# Patient Record
Sex: FEMALE | Race: OTHER | Employment: FULL TIME | URBAN - METROPOLITAN AREA
[De-identification: names, ages, dates, MRNs, and addresses within clinical notes are randomized per-mention and may not be internally consistent; named-entity substitution may affect disease eponyms.]

---

## 2019-02-06 ENCOUNTER — HOSPITAL ENCOUNTER (EMERGENCY)
Age: 19
Discharge: HOME | End: 2019-02-06
Attending: EMERGENCY MEDICINE
Payer: COMMERCIAL

## 2019-02-06 VITALS
TEMPERATURE: 98.2 F | OXYGEN SATURATION: 100 % | SYSTOLIC BLOOD PRESSURE: 117 MMHG | RESPIRATION RATE: 16 BRPM | DIASTOLIC BLOOD PRESSURE: 75 MMHG | HEART RATE: 80 BPM

## 2019-02-06 DIAGNOSIS — R10.30 LOWER ABDOMINAL PAIN: Primary | ICD-10-CM

## 2019-02-06 LAB
A/G RATIO: 1.3 (ref 1.1–2.2)
ALBUMIN SERPL-MCNC: 4.4 G/DL (ref 3.4–5)
ALP BLD-CCNC: 71 U/L (ref 40–129)
ALT SERPL-CCNC: 18 U/L (ref 10–40)
AMYLASE: 61 U/L (ref 25–115)
ANION GAP SERPL CALCULATED.3IONS-SCNC: 12 MMOL/L (ref 3–16)
AST SERPL-CCNC: 17 U/L (ref 15–37)
BACTERIA WET PREP: NORMAL
BACTERIA: ABNORMAL /HPF
BASOPHILS ABSOLUTE: 0.1 K/UL (ref 0–0.2)
BASOPHILS RELATIVE PERCENT: 0.7 %
BILIRUB SERPL-MCNC: 0.3 MG/DL (ref 0–1)
BILIRUBIN URINE: NEGATIVE
BLOOD, URINE: ABNORMAL
BUN BLDV-MCNC: 14 MG/DL (ref 7–20)
CALCIUM SERPL-MCNC: 9.9 MG/DL (ref 8.3–10.6)
CHLORIDE BLD-SCNC: 104 MMOL/L (ref 99–110)
CLARITY: ABNORMAL
CLUE CELLS: NORMAL
CO2: 25 MMOL/L (ref 21–32)
COLOR: YELLOW
CREAT SERPL-MCNC: 0.6 MG/DL (ref 0.6–1.1)
EOSINOPHILS ABSOLUTE: 0.1 K/UL (ref 0–0.6)
EOSINOPHILS RELATIVE PERCENT: 0.6 %
EPITHELIAL CELLS WET PREP: NORMAL
EPITHELIAL CELLS, UA: 7 /HPF (ref 0–5)
GFR AFRICAN AMERICAN: >60
GFR NON-AFRICAN AMERICAN: >60
GLOBULIN: 3.4 G/DL
GLUCOSE BLD-MCNC: 96 MG/DL (ref 70–99)
GLUCOSE URINE: NEGATIVE MG/DL
HCG(URINE) PREGNANCY TEST: NEGATIVE
HCT VFR BLD CALC: 38.8 % (ref 36–48)
HEMOGLOBIN: 12.8 G/DL (ref 12–16)
HYALINE CASTS: 4 /LPF (ref 0–8)
KETONES, URINE: ABNORMAL MG/DL
LEUKOCYTE ESTERASE, URINE: NEGATIVE
LIPASE: 32 U/L (ref 13–60)
LYMPHOCYTES ABSOLUTE: 1.8 K/UL (ref 1–5.1)
LYMPHOCYTES RELATIVE PERCENT: 19.2 %
MCH RBC QN AUTO: 29.5 PG (ref 26–34)
MCHC RBC AUTO-ENTMCNC: 33 G/DL (ref 31–36)
MCV RBC AUTO: 89.2 FL (ref 80–100)
MICROSCOPIC EXAMINATION: YES
MONOCYTES ABSOLUTE: 0.7 K/UL (ref 0–1.3)
MONOCYTES RELATIVE PERCENT: 7.1 %
NEUTROPHILS ABSOLUTE: 6.7 K/UL (ref 1.7–7.7)
NEUTROPHILS RELATIVE PERCENT: 72.4 %
NITRITE, URINE: NEGATIVE
PDW BLD-RTO: 13.9 % (ref 12.4–15.4)
PH UA: 6.5
PLATELET # BLD: 244 K/UL (ref 135–450)
PMV BLD AUTO: 9.3 FL (ref 5–10.5)
POTASSIUM SERPL-SCNC: 4.4 MMOL/L (ref 3.5–5.1)
PROTEIN UA: NEGATIVE MG/DL
RBC # BLD: 4.35 M/UL (ref 4–5.2)
RBC UA: 2 /HPF (ref 0–4)
RBC WET PREP: NORMAL
SODIUM BLD-SCNC: 141 MMOL/L (ref 136–145)
SOURCE WET PREP: NORMAL
SPECIFIC GRAVITY UA: >=1.03
TOTAL PROTEIN: 7.8 G/DL (ref 6.4–8.2)
TRICHOMONAS PREP: NORMAL
URINE TYPE: ABNORMAL
UROBILINOGEN, URINE: 0.2 E.U./DL
WBC # BLD: 9.2 K/UL (ref 4–11)
WBC UA: 2 /HPF (ref 0–5)
WBC WET PREP: NORMAL
YEAST WET PREP: NORMAL

## 2019-02-06 PROCEDURE — 87210 SMEAR WET MOUNT SALINE/INK: CPT

## 2019-02-06 PROCEDURE — 87491 CHLMYD TRACH DNA AMP PROBE: CPT

## 2019-02-06 PROCEDURE — 99284 EMERGENCY DEPT VISIT MOD MDM: CPT

## 2019-02-06 PROCEDURE — 81001 URINALYSIS AUTO W/SCOPE: CPT

## 2019-02-06 PROCEDURE — 80053 COMPREHEN METABOLIC PANEL: CPT

## 2019-02-06 PROCEDURE — 83690 ASSAY OF LIPASE: CPT

## 2019-02-06 PROCEDURE — 87591 N.GONORRHOEAE DNA AMP PROB: CPT

## 2019-02-06 PROCEDURE — 36415 COLL VENOUS BLD VENIPUNCTURE: CPT

## 2019-02-06 PROCEDURE — 82150 ASSAY OF AMYLASE: CPT

## 2019-02-06 PROCEDURE — 84703 CHORIONIC GONADOTROPIN ASSAY: CPT

## 2019-02-06 PROCEDURE — 85025 COMPLETE CBC W/AUTO DIFF WBC: CPT

## 2019-02-06 ASSESSMENT — ENCOUNTER SYMPTOMS
COUGH: 0
COLOR CHANGE: 0
VOMITING: 0
NAUSEA: 0
CONSTIPATION: 0
DIARRHEA: 0
SHORTNESS OF BREATH: 0
WHEEZING: 0
BACK PAIN: 1
ABDOMINAL PAIN: 1

## 2019-02-06 ASSESSMENT — PAIN SCALES - GENERAL: PAINLEVEL_OUTOF10: 8

## 2019-02-07 LAB
C TRACH DNA GENITAL QL NAA+PROBE: NEGATIVE
N. GONORRHOEAE DNA: NEGATIVE

## 2020-05-26 ENCOUNTER — HOSPITAL ENCOUNTER (OUTPATIENT)
Dept: PHYSICAL THERAPY | Age: 20
Setting detail: THERAPIES SERIES
Discharge: HOME | End: 2020-05-26
Payer: COMMERCIAL

## 2020-05-26 PROCEDURE — 97161 PT EVAL LOW COMPLEX 20 MIN: CPT

## 2020-05-26 PROCEDURE — G0283 ELEC STIM OTHER THAN WOUND: HCPCS

## 2020-05-26 PROCEDURE — 97110 THERAPEUTIC EXERCISES: CPT

## 2020-05-26 NOTE — FLOWSHEET NOTE
expectations for rehab  -all patient questions were answered    HEP instruction:    HEP instruction: Written HEP instructions provided and reviewed   Access Code: [email or web address]   URL: [email or web address]   Date: 05/26/2020   Prepared by: Johanna Blackburn     Exercises   · Long Sitting Ankle Plantar Flexion with Resistance - 10 reps - 3 sets - 1x daily - 7x weekly   · Long Sitting Ankle Eversion with Resistance - 10 reps - 3 sets - 1x daily - 7x weekly   · Long Sitting Ankle Inversion with Resistance - 10 reps - 3 sets - 1x daily - 7x weekly   · Seated Toe Raise - 10 reps - 3 sets - 1x daily - 7x weekly          Therapeutic Exercise and NMR EXR  [x] (51502) Provided verbal/tactile cueing for activities related to strengthening, flexibility, endurance, ROM for improvements in  [] LE / Lumbar: LE, proximal hip, and core control with self care, mobility, lifting, ambulation. [] UE / Cervical: cervical, postural, scapular, scapulothoracic and UE control with self care, reaching, carrying, lifting, house/yardwork, driving, computer work.  [] (86971) Provided verbal/tactile cueing for activities related to improving balance, coordination, kinesthetic sense, posture, motor skill, proprioception to assist with   [] LE / lumbar: LE, proximal hip, and core control in self care, mobility, lifting, ambulation and eccentric single leg control. [] UE / cervical: cervical, scapular, scapulothoracic and UE control with self care, reaching, carrying, lifting, house/yardwork, driving, computer work.   [] (98786) Therapist is in constant attendance of 2 or more patients providing skilled therapy interventions, but not providing any significant amount of measurable one-on-one time to either patient, for improvements in  [] LE / lumbar: LE, proximal hip, and core control in self care, mobility, lifting, ambulation and eccentric single leg control.    [] UE / cervical: cervical, scapular, scapulothoracic and UE control with self care,

## 2020-05-28 ENCOUNTER — HOSPITAL ENCOUNTER (OUTPATIENT)
Dept: PHYSICAL THERAPY | Age: 20
Setting detail: THERAPIES SERIES
Discharge: HOME | End: 2020-05-28
Payer: COMMERCIAL

## 2020-05-28 PROCEDURE — 97110 THERAPEUTIC EXERCISES: CPT

## 2020-05-28 PROCEDURE — 97140 MANUAL THERAPY 1/> REGIONS: CPT

## 2020-05-28 PROCEDURE — G0283 ELEC STIM OTHER THAN WOUND: HCPCS

## 2020-05-28 NOTE — FLOWSHEET NOTE
Manual Intervention ([phone])  Start time: 300  End time: 308       L foot/ankle PROM, STM, gentle joint mobs                                              Pt. Education:  -patient educated on diagnosis, prognosis and expectations for rehab  -all patient questions were answered    HEP instruction:    HEP instruction: Written HEP instructions provided and reviewed   Access Code: [email or web address]   URL: [email or web address]   Date: 05/26/2020   Prepared by: Jaime Purdy     Exercises   · Long Sitting Ankle Plantar Flexion with Resistance - 10 reps - 3 sets - 1x daily - 7x weekly   · Long Sitting Ankle Eversion with Resistance - 10 reps - 3 sets - 1x daily - 7x weekly   · Long Sitting Ankle Inversion with Resistance - 10 reps - 3 sets - 1x daily - 7x weekly   · Seated Toe Raise - 10 reps - 3 sets - 1x daily - 7x weekly          Therapeutic Exercise and NMR EXR  [x] (86073) Provided verbal/tactile cueing for activities related to strengthening, flexibility, endurance, ROM for improvements in  [] LE / Lumbar: LE, proximal hip, and core control with self care, mobility, lifting, ambulation. [] UE / Cervical: cervical, postural, scapular, scapulothoracic and UE control with self care, reaching, carrying, lifting, house/yardwork, driving, computer work.  [] (87865) Provided verbal/tactile cueing for activities related to improving balance, coordination, kinesthetic sense, posture, motor skill, proprioception to assist with   [] LE / lumbar: LE, proximal hip, and core control in self care, mobility, lifting, ambulation and eccentric single leg control.    [] UE / cervical: cervical, scapular, scapulothoracic and UE control with self care, reaching, carrying, lifting, house/yardwork, driving, computer work.   [] (50897) Therapist is in constant attendance of 2 or more patients providing skilled therapy interventions, but not providing any significant amount of measurable one-on-one time to either patient, for improvements in  [] LE / lumbar: LE, proximal hip, and core control in self care, mobility, lifting, ambulation and eccentric single leg control. [] UE / cervical: cervical, scapular, scapulothoracic and UE control with self care, reaching, carrying, lifting, house/yardwork, driving, computer work. NMR and Therapeutic Activities:    [] (86961 or 33187) Provided verbal/tactile cueing for activities related to improving balance, coordination, kinesthetic sense, posture, motor skill, proprioception and motor activation to allow for proper function of   [] LE: / Lumbar core, proximal hip and LE with self care and ADLs  [] UE / Cervical: cervical, postural, scapular, scapulothoracic and UE control with self care, carrying, lifting, driving, computer work.   [] (46881) Gait Re-education- Provided training and instruction to the patient for proper LE, core and proximal hip recruitment and positioning and eccentric body weight control with ambulation re-education including up and down stairs     Home Management Training / Self Care:  [] (13526) Home Management Training / Self-care: ADLs and compensatory training, meal preparation, safety procedures and instruction in use of adaptive equipment, including bathing, grooming, dressing, personal hygiene, basic household cleaning and chores.      Home Exercise Program:    [x] (75673) Reviewed/Progressed HEP activities related to strengthening, flexibility, endurance, ROM of   [] LE / Lumbar: core, proximal hip and LE for functional self-care, mobility, lifting and ambulation/stair navigation   [] UE / Cervical: cervical, postural, scapular, scapulothoracic and UE control with self care, reaching, carrying, lifting, house/yardwork, driving, computer work  [] (32336)Reviewed/Progressed HEP activities related to improving balance, coordination, kinesthetic sense, posture, motor skill, proprioception of   [] LE: core, proximal hip and LE for self care, mobility, lifting, and ambulation/stair navigation    [] UE / Cervical: cervical, postural,  scapular, scapulothoracic and UE control with self care, reaching, carrying, lifting, house/yardwork, driving, computer work    Manual Treatments:  PROM / STM / Oscillations-Mobs:  G-I, II, III, IV (PA's, Inf., Post.)  [x] (98356) Provided manual therapy to mobilize LE, proximal hip and/or LS spine soft tissue/joints for the purpose of modulating pain, promoting relaxation,  increasing ROM, reducing/eliminating soft tissue swelling/inflammation/restriction, improving soft tissue extensibility and allowing for proper ROM for normal function with   [] LE / lumbar: self care, mobility, lifting and ambulation. [] UE / Cervical: self care, reaching, carrying, lifting, house/yardwork, driving, computer work. Modalities:  [x] (93307) Vasopneumatic compression: Utilized vasopneumatic compression to decrease edema / swelling for the purpose of improving mobility and quad tone / recruitment which will allow for increased overall function including but not limited to self-care, transfers, ambulation, and ascending / descending stairs. Modalities:  IFC to L ankle , 15 min with CP  Start time:  340  End time: 355    Charges:  Timed Code Treatment Minutes: 55   Total Treatment Minutes: 55      [] EVAL - LOW (03562)   [] EVAL - MOD (11431)  [] EVAL - HIGH (33803)  [] RE-EVAL (82634)  [x] GT(38083) x   2    [] Ionto  [] NMR (72180) x       [] Vaso  [x] Manual (74058) x 1     [] Ultrasound  [] TA x        [] Mech Traction (50112)  [] Aquatic Therapy x     [x] ES (un) (41275):   [] Home Management Training x  [] ES(attended) (41729)   [] Dry Needling 1-2 muscles (46249):  [] Dry Needling 3+ muscles (751150  [] Group:      [] Other:       GOALS:  Patient stated goal: to get back to work   [x]? Progressing: []? Met: []? Not Met: []? Adjusted     Therapist goals for Patient:   Short Term Goals: To be achieved in: 2 weeks  1. []Housework  []Driving [x]Job related tasks  []Sports/Recreation []Other:        ASSESSMENT:  Pt demonstrate improved ROM this date after manual tx. Pt continues to be limited by pain and faitgue of foot/ankle muscles during tx. Treatment/Activity Tolerance:  [x] Patient able to complete tx [] Patient limited by fatigue  [x] Patient limited by pain  [] Patient limited by other medical complications  [] Other:     Prognosis: [x] Good [] Fair  [] Poor    Patient Requires Follow-up: [x] Yes  [] No    Plan for next treatment session: progress ankle exercises, balance     PLAN: See eval. PT 2x / week for 6 weeks. [x] Continue per plan of care [] Alter current plan (see comments)  [] Plan of care initiated [] Hold pending MD visit [] Discharge    Electronically signed by: Kaz Calvillo PT, DPT    Note: If patient does not return for scheduled/ recommended follow up visits, this note will serve as a discharge from care along with most recent update on progress.

## 2020-06-02 ENCOUNTER — HOSPITAL ENCOUNTER (OUTPATIENT)
Dept: PHYSICAL THERAPY | Age: 20
Setting detail: THERAPIES SERIES
Discharge: HOME | End: 2020-06-02
Payer: COMMERCIAL

## 2020-06-02 PROCEDURE — 97140 MANUAL THERAPY 1/> REGIONS: CPT

## 2020-06-02 PROCEDURE — 97112 NEUROMUSCULAR REEDUCATION: CPT

## 2020-06-02 PROCEDURE — G0283 ELEC STIM OTHER THAN WOUND: HCPCS

## 2020-06-02 PROCEDURE — 97110 THERAPEUTIC EXERCISES: CPT

## 2020-06-02 NOTE — FLOWSHEET NOTE
Adjusted     Therapist goals for Patient:   Short Term Goals: To be achieved in: 2 weeks  1. Independent in HEP and progression per patient tolerance, in order to prevent re-injury. [x]? Progressing: []? Met: []? Not Met: []? Adjusted  2. Patient will have a decrease in pain to 0/10 to facilitate improvement in movement, function, and ADLs as indicated by Functional Deficits. [x]? Progressing: []? Met: []? Not Met: []? Adjusted     Long Term Goals: To be achieved in: 6 weeks  1. Disability index score of 90% or less for the LEFS to assist with reaching prior level of function. [x]? Progressing: []? Met: []? Not Met: []? Adjusted  2. Patient will demonstrate increased AROM to L foot to WNL to allow for proper joint functioning as indicated by patients Functional Deficits. [x]? Progressing: []? Met: []? Not Met: []? Adjusted  3. Patient will demonstrate an increase in Strength to at least 4+/5 to L foot as well as good proximal hip strength and control to allow for proper functional mobility as indicated by patients Functional Deficits. [x]? Progressing: []? Met: []? Not Met: []? Adjusted  4. Patient will return to functional activities including walking, standing for prolonged periods of time without increased symptoms or restriction. [x]? Progressing: []? Met: []? Not Met: []? Adjusted         Overall Progression Towards Functional goals/ Treatment Progress Update:  [] Patient is progressing as expected towards functional goals listed. [] Progression is slowed due to complexities/Impairments listed. [] Progression has been slowed due to co-morbidities.   [x] Plan just implemented, too soon to assess goals progression <30days   [] Goals require adjustment due to lack of progress  [] Patient is not progressing as expected and requires additional follow up with physician  [] Other    Persisting Functional Limitations/Impairments:  []Sleeping []Sitting               [x]Standing []Transfers        [x]Walking []Kneeling               [x]Stairs []Squatting / bending   [x]ADLs []Reaching  []Lifting  []Housework  []Driving [x]Job related tasks  []Sports/Recreation []Other:        ASSESSMENT:  Pt is progressing through exercises and treatment well with no increased pain and pt is demonstrating improved ROM and strength. PT Issued new lime theraband for HEP to keep progressing strength. Single leg balance was introduced this date with alittle discomfort with BUE. Treatment/Activity Tolerance:  [x] Patient able to complete tx [] Patient limited by fatigue  [x] Patient limited by pain  [] Patient limited by other medical complications  [] Other:     Prognosis: [x] Good [] Fair  [] Poor    Patient Requires Follow-up: [x] Yes  [] No    Plan for next treatment session: progress ankle exercises, balance     PLAN: See jayna. PT 2x / week for 6 weeks. [x] Continue per plan of care [] Alter current plan (see comments)  [] Plan of care initiated [] Hold pending MD visit [] Discharge    Electronically signed by: Ghulam Adair PT, DPT    Note: If patient does not return for scheduled/ recommended follow up visits, this note will serve as a discharge from care along with most recent update on progress.

## 2020-06-04 ENCOUNTER — HOSPITAL ENCOUNTER (OUTPATIENT)
Dept: PHYSICAL THERAPY | Age: 20
Setting detail: THERAPIES SERIES
Discharge: HOME | End: 2020-06-04
Payer: COMMERCIAL

## 2020-06-04 PROCEDURE — 97140 MANUAL THERAPY 1/> REGIONS: CPT

## 2020-06-04 PROCEDURE — 97110 THERAPEUTIC EXERCISES: CPT

## 2020-06-04 PROCEDURE — G0283 ELEC STIM OTHER THAN WOUND: HCPCS

## 2020-06-04 PROCEDURE — 97112 NEUROMUSCULAR REEDUCATION: CPT

## 2020-06-04 NOTE — FLOWSHEET NOTE
weight control with ambulation re-education including up and down stairs     Home Management Training / Self Care:  [] (44933) Home Management Training / Self-care: ADLs and compensatory training, meal preparation, safety procedures and instruction in use of adaptive equipment, including bathing, grooming, dressing, personal hygiene, basic household cleaning and chores. Home Exercise Program:    [x] (17300) Reviewed/Progressed HEP activities related to strengthening, flexibility, endurance, ROM of   [] LE / Lumbar: core, proximal hip and LE for functional self-care, mobility, lifting and ambulation/stair navigation   [] UE / Cervical: cervical, postural, scapular, scapulothoracic and UE control with self care, reaching, carrying, lifting, house/yardwork, driving, computer work  [] (35510)Reviewed/Progressed HEP activities related to improving balance, coordination, kinesthetic sense, posture, motor skill, proprioception of   [] LE: core, proximal hip and LE for self care, mobility, lifting, and ambulation/stair navigation    [] UE / Cervical: cervical, postural,  scapular, scapulothoracic and UE control with self care, reaching, carrying, lifting, house/yardwork, driving, computer work    Manual Treatments:  PROM / STM / Oscillations-Mobs:  G-I, II, III, IV (PA's, Inf., Post.)  [x] (56491) Provided manual therapy to mobilize LE, proximal hip and/or LS spine soft tissue/joints for the purpose of modulating pain, promoting relaxation,  increasing ROM, reducing/eliminating soft tissue swelling/inflammation/restriction, improving soft tissue extensibility and allowing for proper ROM for normal function with   [] LE / lumbar: self care, mobility, lifting and ambulation. [] UE / Cervical: self care, reaching, carrying, lifting, house/yardwork, driving, computer work.      Modalities:  [x] (66244) Vasopneumatic compression: Utilized vasopneumatic compression to decrease edema / swelling for the purpose of improving mobility and quad tone / recruitment which will allow for increased overall function including but not limited to self-care, transfers, ambulation, and ascending / descending stairs. Modalities:  IFC to L ankle , 15 min with CP  Start time:  343  End time: 358    Charges:  Timed Code Treatment Minutes: 43   Total Treatment Minutes: 58      [] EVAL - LOW (39576)   [] EVAL - MOD (96613)  [] EVAL - HIGH (26082)  [] RE-EVAL (01504)  [x] ZA(13353) x   1    [] Ionto  [x] NMR (93268) x  1     [] Vaso  [x] Manual (61339) x 1     [] Ultrasound  [] TA x        [] Mech Traction (46556)  [] Aquatic Therapy x     [x] ES (un) (18782):   [] Home Management Training x  [] ES(attended) (81016)   [] Dry Needling 1-2 muscles (00289):  [] Dry Needling 3+ muscles (802784  [] Group:      [] Other:       GOALS:  Patient stated goal: to get back to work   [x]? Progressing: []? Met: []? Not Met: []? Adjusted     Therapist goals for Patient:   Short Term Goals: To be achieved in: 2 weeks  1. Independent in HEP and progression per patient tolerance, in order to prevent re-injury. [x]? Progressing: []? Met: []? Not Met: []? Adjusted  2. Patient will have a decrease in pain to 0/10 to facilitate improvement in movement, function, and ADLs as indicated by Functional Deficits. [x]? Progressing: []? Met: []? Not Met: []? Adjusted     Long Term Goals: To be achieved in: 6 weeks  1. Disability index score of 90% or less for the LEFS to assist with reaching prior level of function. [x]? Progressing: []? Met: []? Not Met: []? Adjusted  2. Patient will demonstrate increased AROM to L foot to WNL to allow for proper joint functioning as indicated by patients Functional Deficits. [x]? Progressing: []? Met: []? Not Met: []? Adjusted  3. Patient will demonstrate an increase in Strength to at least 4+/5 to L foot as well as good proximal hip strength and control to allow for proper functional mobility as indicated by patients Functional Deficits. pending MD visit [] Discharge    Electronically signed by: Negar Pacheco PT, DPT    Note: If patient does not return for scheduled/ recommended follow up visits, this note will serve as a discharge from care along with most recent update on progress.

## 2020-06-09 ENCOUNTER — HOSPITAL ENCOUNTER (OUTPATIENT)
Dept: PHYSICAL THERAPY | Age: 20
Setting detail: THERAPIES SERIES
Discharge: HOME | End: 2020-06-09
Payer: COMMERCIAL

## 2020-06-09 PROCEDURE — G0283 ELEC STIM OTHER THAN WOUND: HCPCS

## 2020-06-09 PROCEDURE — 97112 NEUROMUSCULAR REEDUCATION: CPT

## 2020-06-09 PROCEDURE — 97110 THERAPEUTIC EXERCISES: CPT

## 2020-06-09 NOTE — FLOWSHEET NOTE
[facility]  Phone: [phone]   Fax: [phone]    Physical Therapy Daily Treatment Note  Date:  2020    Patient Name:  Alex Pulliam    :  2000  MRN: [id number]  Medical/Treatment Diagnosis Information:    Diagnosis: Q44.230C (ICD-10-CM) - Unspecified sprain of left foot, initial encounter , S90.32XA (ICD-10-CM) - Contusion of left foot, initial encounter      Treatment Diagnosis: L foot pain, weakness, decreased ROM        Insurance/Certification information:   [facility]  Physician Information:    Referring Practitioner: LUZ MARIA Trejo      Plan of care signed (Y/N): []  Yes [x]  No     Date of Patient follow up with Physician:      Progress Report: []  Yes  [x]  No     Date Range for reporting period:  Beginning 20  Ending    Progress report due (10 Rx/or 30 days whichever is less): weekly     Recertification due (POC duration/ or 90 days whichever is less): 20    Visit # Insurance Allowable Auth required? Date Range    12 [x]  Yes  []  No n/a     Latex Allergy:  [x]NO      []YES  Preferred Language for Healthcare:   [x]English       []other:    Functional Scale:       Date assessed:  LEFS: raw score = 48; dysfunction = 40-59%   20     Pain level:  3/10 only when walking      SUBJECTIVE:  Pt reports she is able to do a little more now. Ankle is slowly getting better. Pain is decreasing.      OBJECTIVE:       PROM AROM AROM PROM      L R L R L /4 L 6/4    Hip Flexion             Hip Abduction             Hip ER             Hip IR             Knee Flexion             Knee Extension             Dorsiflexion  10 20 5 20 7 15    Plantarflexion  50 50 45 50 50 50    Inversion  25 30 20 30 20 25    Eversion  15 25 10 25 15 20          Strength (0-5) / Myotomes Left Right L  6/4     Hip Flexion - supine         Hip Flexion - seated (L1-2)         Hip Abduction         Hip Adduction         Hip ER         Hip IR

## 2020-06-11 ENCOUNTER — HOSPITAL ENCOUNTER (OUTPATIENT)
Dept: PHYSICAL THERAPY | Age: 20
Setting detail: THERAPIES SERIES
Discharge: HOME | End: 2020-06-11
Payer: COMMERCIAL

## 2020-06-11 PROCEDURE — G0283 ELEC STIM OTHER THAN WOUND: HCPCS

## 2020-06-11 PROCEDURE — 97140 MANUAL THERAPY 1/> REGIONS: CPT

## 2020-06-11 PROCEDURE — 97110 THERAPEUTIC EXERCISES: CPT

## 2020-06-11 PROCEDURE — 97112 NEUROMUSCULAR REEDUCATION: CPT

## 2020-06-11 NOTE — FLOWSHEET NOTE
Resistance - 10 reps - 3 sets - 1x daily - 7x weekly   · Long Sitting Ankle Eversion with Resistance - 10 reps - 3 sets - 1x daily - 7x weekly   · Long Sitting Ankle Inversion with Resistance - 10 reps - 3 sets - 1x daily - 7x weekly   · Seated Toe Raise - 10 reps - 3 sets - 1x daily - 7x weekly          Therapeutic Exercise and NMR EXR  [x] (69004) Provided verbal/tactile cueing for activities related to strengthening, flexibility, endurance, ROM for improvements in  [] LE / Lumbar: LE, proximal hip, and core control with self care, mobility, lifting, ambulation. [] UE / Cervical: cervical, postural, scapular, scapulothoracic and UE control with self care, reaching, carrying, lifting, house/yardwork, driving, computer work.  [] (80703) Provided verbal/tactile cueing for activities related to improving balance, coordination, kinesthetic sense, posture, motor skill, proprioception to assist with   [] LE / lumbar: LE, proximal hip, and core control in self care, mobility, lifting, ambulation and eccentric single leg control. [] UE / cervical: cervical, scapular, scapulothoracic and UE control with self care, reaching, carrying, lifting, house/yardwork, driving, computer work.   [] (68225) Therapist is in constant attendance of 2 or more patients providing skilled therapy interventions, but not providing any significant amount of measurable one-on-one time to either patient, for improvements in  [] LE / lumbar: LE, proximal hip, and core control in self care, mobility, lifting, ambulation and eccentric single leg control. [] UE / cervical: cervical, scapular, scapulothoracic and UE control with self care, reaching, carrying, lifting, house/yardwork, driving, computer work.      NMR and Therapeutic Activities:    [x] (15819 or 06667) Provided verbal/tactile cueing for activities related to improving balance, coordination, kinesthetic sense, posture, motor skill, proprioception and motor activation to allow for Treatment/Activity Tolerance:   [x] Patient able to complete tx [] Patient limited by fatigue  [x] Patient limited by pain  [] Patient limited by other medical complications  [] Other:     Prognosis: [x] Good [] Fair  [] Poor    Patient Requires Follow-up: [x] Yes  [] No    Plan for next treatment session: progress ankle exercises, balance     PLAN: See jayna. PT 2x / week for 6 weeks. [x] Continue per plan of care [] Alter current plan (see comments)  [] Plan of care initiated [] Hold pending MD visit [] Discharge    Electronically signed by: Richelle Kimble PT, DPT    Note: If patient does not return for scheduled/ recommended follow up visits, this note will serve as a discharge from care along with most recent update on progress.

## 2020-06-16 ENCOUNTER — HOSPITAL ENCOUNTER (OUTPATIENT)
Dept: PHYSICAL THERAPY | Age: 20
Setting detail: THERAPIES SERIES
Discharge: HOME | End: 2020-06-16
Payer: COMMERCIAL

## 2020-06-16 PROCEDURE — G0283 ELEC STIM OTHER THAN WOUND: HCPCS

## 2020-06-16 PROCEDURE — 97112 NEUROMUSCULAR REEDUCATION: CPT

## 2020-06-16 PROCEDURE — 97140 MANUAL THERAPY 1/> REGIONS: CPT

## 2020-06-16 PROCEDURE — 97110 THERAPEUTIC EXERCISES: CPT

## 2020-06-16 NOTE — FLOWSHEET NOTE
[facility] Physical Therapy  Phone: [phone]   Fax: [phone]    Physical Therapy Daily Treatment Note  Date:  2020    Patient Name:  Solo Pulliam    :  2000  MRN: [id number]  Medical/Treatment Diagnosis Information:    Diagnosis: X33.451K (ICD-10-CM) - Unspecified sprain of left foot, initial encounter , S90.32XA (ICD-10-CM) - Contusion of left foot, initial encounter      Treatment Diagnosis: L foot pain, weakness, decreased ROM        Insurance/Certification information:   [facility]  Physician Information:    Referring Practitioner: LUZ MARIA Pham      Plan of care signed (Y/N): []  Yes [x]  No     Date of Patient follow up with Physician:      Progress Report: [x]  Yes  []  No     Date Range for reporting period:  Beginning 20  Endin20    Progress report due (10 Rx/or 30 days whichever is less): weekly     Recertification due (POC duration/ or 90 days whichever is less): 20    Visit # Insurance Allowable Auth required? Date Range    12 [x]  Yes  []  No n/a     Latex Allergy:  [x]NO      []YES  Preferred Language for Healthcare:   [x]English       []other:    Functional Scale:       Date assessed:  LEFS: raw score = 48; dysfunction = 40-59%   20     Pain level:  5-6/10 only when walking      SUBJECTIVE:  Pt reports they got a new walking boot that she is  able to wear at work which is helping her a lot.      OBJECTIVE:       PROM AROM AROM PROM AROM PROM     L R L R L / L 6/ L / L 6/11   Hip Flexion               Hip Abduction               Hip ER               Hip IR               Knee Flexion               Knee Extension               Dorsiflexion  10 20 5 20 7 15 5 15   Plantarflexion  50 50 45 50 50 50 50 60   Inversion  25 30 20 30 20 25 35 40   Eversion  15 25 10 25 15 20 15 25         Strength (0-5) / Myotomes Left Right L  6/4 L 6   Hip Flexion - supine         Hip Flexion - seated (L1-2)         Hip allowing for proper ROM for normal function with   [] LE / lumbar: self care, mobility, lifting and ambulation. [] UE / Cervical: self care, reaching, carrying, lifting, house/yardwork, driving, computer work. Modalities:  [x] (54092) Vasopneumatic compression: Utilized vasopneumatic compression to decrease edema / swelling for the purpose of improving mobility and quad tone / recruitment which will allow for increased overall function including but not limited to self-care, transfers, ambulation, and ascending / descending stairs. Modalities:  IFC to L ankle , 15 min with CP  Start time:  341  End time: 356    Charges:  Timed Code Treatment Minutes: 41   Total Treatment Minutes: 56      [] EVAL - LOW (91669)   [] EVAL - MOD (72407)  [] EVAL - HIGH (22740)  [] RE-EVAL (15305)  [x] JY(12428) x   1    [] Ionto  [x] NMR (06889) x  1     [] Vaso  [x] Manual (75238) x 1     [] Ultrasound  [] TA x        [] Mech Traction (59147)  [] Aquatic Therapy x     [x] ES (un) (30604):   [] Home Management Training x  [] ES(attended) (38859)   [] Dry Needling 1-2 muscles (70302):  [] Dry Needling 3+ muscles (144383  [] Group:      [] Other:       GOALS:  Patient stated goal: to get back to work   [x]? Progressing: []? Met: []? Not Met: []? Adjusted     Therapist goals for Patient:   Short Term Goals: To be achieved in: 2 weeks  1. Independent in HEP and progression per patient tolerance, in order to prevent re-injury. [x]? Progressing: []? Met: []? Not Met: []? Adjusted  2. Patient will have a decrease in pain to 0/10 to facilitate improvement in movement, function, and ADLs as indicated by Functional Deficits. [x]? Progressing: []? Met: []? Not Met: []? Adjusted     Long Term Goals: To be achieved in: 6 weeks  1. Disability index score of 90% or less for the LEFS to assist with reaching prior level of function. [x]? Progressing: []? Met: []? Not Met: []? Adjusted  2.  Patient will demonstrate increased AROM to L foot

## 2020-06-18 ENCOUNTER — HOSPITAL ENCOUNTER (OUTPATIENT)
Dept: PHYSICAL THERAPY | Age: 20
Setting detail: THERAPIES SERIES
Discharge: HOME | End: 2020-06-18
Payer: COMMERCIAL

## 2020-06-18 PROCEDURE — G0283 ELEC STIM OTHER THAN WOUND: HCPCS

## 2020-06-18 PROCEDURE — 97110 THERAPEUTIC EXERCISES: CPT

## 2020-06-18 PROCEDURE — 97112 NEUROMUSCULAR REEDUCATION: CPT

## 2020-06-18 PROCEDURE — 97140 MANUAL THERAPY 1/> REGIONS: CPT

## 2020-06-18 NOTE — FLOWSHEET NOTE
Flexion - seated (L1-2)           Hip Abduction           Hip Adduction           Hip ER           Hip IR           Quads (L2-4)           Hamstrings           Ankle Dorsiflexion (L4-5) 3+ 5 4 4 4+    Ankle Plantarflexion (S1-2) 3+ 5 4 4+ 4+    Ankle Inversion 3+ 5 4- 4- 4     Ankle Eversion (S1-2) 3+ 5 4- 4- 4    Great Toe Extension (L5)                                                                                                                                                                     RESTRICTIONS/PRECAUTIONS: n/a    Exercises/Interventions:     Therapeutic Exercises (74457)  Start time: 312  End time: 335 Resistance / level Sets/sec Reps Notes   4 way ankle  ^to blue   nustep   5'      AROM L ankle ABCs with #1  2  ^ 1lb ankle weight around foot   INV isometrics with ball  2 10 3 sec holds   Towel cruntches     Towel Talking Layers wipers      IB/ single HR  2 30'' 10 single HR each set, with UE support     Foot marble         Toe walk  1lap      Heel walk  1 lap                   Therapeutic Activities (63299)  Start time:  End time:                                   Neuromuscular Re-ed (20174)  Start time: 335  End time: 345       airex single leg stance , semi tandem  LLE with UE PRN     BAPS board taps  2 10 Each direction , in //   Balance rocker board   Fwd & lat in //  , UE support PRN   CC walk outs fwd, backward, lat  Each    Step ups on armani disc   2 10 UE support in //   baps board CC/CCW       Cone walk  No UE support                         Soccer ball rolls       sitting   Manual Intervention ([address] [facility])  Start time: 3:02  End time: 3:12       L foot/ankle PROM, ,  mobs, Manual resistance for DF/PF/IN/EV x10 mins                                                    Pt. Education:  -patient educated on diagnosis, prognosis and expectations for rehab  -all patient questions were answered    HEP instruction:    HEP instruction: Written HEP instructions provided and reviewed   Access Code: [email or web address]   URL: [email or web address]   Date: 05/26/2020   Prepared by: Freida Hall     Exercises   · Long Sitting Ankle Plantar Flexion with Resistance - 10 reps - 3 sets - 1x daily - 7x weekly   · Long Sitting Ankle Eversion with Resistance - 10 reps - 3 sets - 1x daily - 7x weekly   · Long Sitting Ankle Inversion with Resistance - 10 reps - 3 sets - 1x daily - 7x weekly   · Seated Toe Raise - 10 reps - 3 sets - 1x daily - 7x weekly          Therapeutic Exercise and NMR EXR  [x] (20529) Provided verbal/tactile cueing for activities related to strengthening, flexibility, endurance, ROM for improvements in  [] LE / Lumbar: LE, proximal hip, and core control with self care, mobility, lifting, ambulation. [] UE / Cervical: cervical, postural, scapular, scapulothoracic and UE control with self care, reaching, carrying, lifting, house/yardwork, driving, computer work.  [] (67140) Provided verbal/tactile cueing for activities related to improving balance, coordination, kinesthetic sense, posture, motor skill, proprioception to assist with   [] LE / lumbar: LE, proximal hip, and core control in self care, mobility, lifting, ambulation and eccentric single leg control. [] UE / cervical: cervical, scapular, scapulothoracic and UE control with self care, reaching, carrying, lifting, house/yardwork, driving, computer work.   [] (69139) Therapist is in constant attendance of 2 or more patients providing skilled therapy interventions, but not providing any significant amount of measurable one-on-one time to either patient, for improvements in  [] LE / lumbar: LE, proximal hip, and core control in self care, mobility, lifting, ambulation and eccentric single leg control. [] UE / cervical: cervical, scapular, scapulothoracic and UE control with self care, reaching, carrying, lifting, house/yardwork, driving, computer work.      NMR and Therapeutic Activities:    [x] (78186 or 33685) Provided verbal/tactile cueing for less for the LEFS to assist with reaching prior level of function. [x]? Progressing: []? Met: []? Not Met: []? Adjusted  2. Patient will demonstrate increased AROM to L foot to WNL to allow for proper joint functioning as indicated by patients Functional Deficits. [x]? Progressing: []? Met: []? Not Met: []? Adjusted  3. Patient will demonstrate an increase in Strength to at least 4+/5 to L foot as well as good proximal hip strength and control to allow for proper functional mobility as indicated by patients Functional Deficits. [x]? Progressing: []? Met: []? Not Met: []? Adjusted  4. Patient will return to functional activities including walking, standing for prolonged periods of time without increased symptoms or restriction. [x]? Progressing: []? Met: []? Not Met: []? Adjusted         Overall Progression Towards Functional goals/ Treatment Progress Update:  [] Patient is progressing as expected towards functional goals listed. [] Progression is slowed due to complexities/Impairments listed. [] Progression has been slowed due to co-morbidities. [x] Plan just implemented, too soon to assess goals progression <30days   [] Goals require adjustment due to lack of progress  [] Patient is not progressing as expected and requires additional follow up with physician  [] Other    Persisting Functional Limitations/Impairments:  []Sleeping []Sitting               [x]Standing []Transfers        [x]Walking []Kneeling               [x]Stairs []Squatting / bending   [x]ADLs []Reaching  []Lifting  []Housework  []Driving [x]Job related tasks  []Sports/Recreation []Other:        ASSESSMENT:  Pt is progressing well with treatment. She is continuing to progress ROM to L ankle both passively and active. She has improved strength and is tolerating more of the exercises. She is only having pain and tenderness on inside arch when wearing shoes/boots or to the touch.  She can still make improvements in strength/ ROM as she is

## 2020-06-23 ENCOUNTER — HOSPITAL ENCOUNTER (OUTPATIENT)
Dept: PHYSICAL THERAPY | Age: 20
Setting detail: THERAPIES SERIES
Discharge: HOME | End: 2020-06-23
Payer: COMMERCIAL

## 2020-06-23 PROCEDURE — 97110 THERAPEUTIC EXERCISES: CPT

## 2020-06-23 PROCEDURE — G0283 ELEC STIM OTHER THAN WOUND: HCPCS

## 2020-06-23 PROCEDURE — 97112 NEUROMUSCULAR REEDUCATION: CPT

## 2020-06-23 NOTE — FLOWSHEET NOTE
by Functional Deficits. [x]? Progressing: []? Met: []? Not Met: []? Adjusted     Long Term Goals: To be achieved in: 6 weeks  1. Disability index score of 90% or less for the LEFS to assist with reaching prior level of function. [x]? Progressing: []? Met: []? Not Met: []? Adjusted  2. Patient will demonstrate increased AROM to L foot to WNL to allow for proper joint functioning as indicated by patients Functional Deficits. [x]? Progressing: []? Met: []? Not Met: []? Adjusted  3. Patient will demonstrate an increase in Strength to at least 4+/5 to L foot as well as good proximal hip strength and control to allow for proper functional mobility as indicated by patients Functional Deficits. [x]? Progressing: []? Met: []? Not Met: []? Adjusted  4. Patient will return to functional activities including walking, standing for prolonged periods of time without increased symptoms or restriction. [x]? Progressing: []? Met: []? Not Met: []? Adjusted         Overall Progression Towards Functional goals/ Treatment Progress Update:  [] Patient is progressing as expected towards functional goals listed. [] Progression is slowed due to complexities/Impairments listed. [] Progression has been slowed due to co-morbidities. [x] Plan just implemented, too soon to assess goals progression <30days   [] Goals require adjustment due to lack of progress  [] Patient is not progressing as expected and requires additional follow up with physician  [] Other    Persisting Functional Limitations/Impairments:  []Sleeping []Sitting               [x]Standing []Transfers        [x]Walking []Kneeling               [x]Stairs []Squatting / bending   [x]ADLs []Reaching  []Lifting  []Housework  []Driving [x]Job related tasks  []Sports/Recreation []Other:        ASSESSMENT: Pt tolerated closed chain and balance activities this date well. She still requires PRN UE support with more difficult balance activities but is progressing well.

## 2020-06-25 ENCOUNTER — HOSPITAL ENCOUNTER (OUTPATIENT)
Dept: PHYSICAL THERAPY | Age: 20
Setting detail: THERAPIES SERIES
Discharge: HOME | End: 2020-06-25
Payer: COMMERCIAL

## 2020-06-30 ENCOUNTER — HOSPITAL ENCOUNTER (OUTPATIENT)
Dept: PHYSICAL THERAPY | Age: 20
Setting detail: THERAPIES SERIES
Discharge: HOME | End: 2020-06-30
Payer: COMMERCIAL

## 2020-06-30 PROCEDURE — 97112 NEUROMUSCULAR REEDUCATION: CPT

## 2020-06-30 PROCEDURE — 97110 THERAPEUTIC EXERCISES: CPT

## 2020-06-30 NOTE — FLOWSHEET NOTE
[address] [facility] Physical Therapy  Phone: [phone]   Fax: [phone]    Physical Therapy Daily Treatment Note  Date:  2020    Patient Name:  Vikash Martell    :  2000  MRN: [id number]  Medical/Treatment Diagnosis Information:    Diagnosis: J08.598J (ICD-10-CM) - Unspecified sprain of left foot, initial encounter , S90.32XA (ICD-10-CM) - Contusion of left foot, initial encounter      Treatment Diagnosis: L foot pain, weakness, decreased ROM        Insurance/Certification information:   [facility]  Physician Information:    Referring Practitioner: LUZ MARIA Lam      Plan of care signed (Y/N): []  Yes [x]  No     Date of Patient follow up with Physician:      Progress Report: [x]  Yes  []  No     Date Range for reporting period:  Beginning 20  Endin20    Progress report due (10 Rx/or 30 days whichever is less): weekly      Recertification due (POC duration/ or 90 days whichever is less): 20    Visit # Insurance Allowable Auth required? Date Range   10/12 12 [x]  Yes  []  No n/a     Latex Allergy:  [x]NO      []YES  Preferred Language for Healthcare:   [x]English       []other:    Functional Scale:       Date assessed:  LEFS: raw score = 69; dysfunction = 1-19%   20     Pain level:  0/10      SUBJECTIVE:  Pt reports ankle is feeling a lot better. No pain when walking still, everything is going good at work.         OBJECTIVE:       PROM AROM AROM PROM AROM PROM AROM PROM     L R L R L 6/4 L 6/4 L 6/11 L 6/11 L 6/18 L 6/18   Hip Flexion                 Hip Abduction                 Hip ER                 Hip IR                 Knee Flexion                 Knee Extension                 Dorsiflexion  10 20 5 20 7 15 5 15 7 15   Plantarflexion  50 50 45 50 50 50 50 60 52 60   Inversion  25 30 20 30 20 25 35 40 35 45   Eversion  15 25 10 25 15 20 15 25 25 27         Strength (0-5) / Myotomes Left Right L  6/4 L 6/11 L 6/18    Hip Flexion - supine           Hip Flexion - seated (L1-2)           Hip Abduction           Hip Adduction           Hip ER           Hip IR           Quads (L2-4)           Hamstrings           Ankle Dorsiflexion (L4-5) 3+ 5 4 4 4+    Ankle Plantarflexion (S1-2) 3+ 5 4 4+ 4+    Ankle Inversion 3+ 5 4- 4- 4     Ankle Eversion (S1-2) 3+ 5 4- 4- 4    Great Toe Extension (L5)                                                                                                                                                                     RESTRICTIONS/PRECAUTIONS: n/a    Exercises/Interventions:     Therapeutic Exercises (51929)  Start time: 328  End time: 355 Resistance / level Sets/sec Reps Notes   4 way ankle  ^to blue   nustep  2.0 5'   ^ level 2.0 6/23    AROM L ankle  ^ 1lb ankle weight around foot   INV isometrics with ball  3 sec holds   Towel crunt[employer] wipers      IB/  HR  3 30'' 10  HR each set, without UE support     Foot marble         Toe walk  2 lap   6/30 ^LAPS   Heel walk  2 lap  6/30 ^LAPS   TG squats,R abd, HR  2 10 Cues to maintain arch    R Lateral step down- 4 in   2 10 L SLS   [employer]:  FM step up  FM standing weighted heel raise  Seated heel raise  TRX rev lunge  SL RDL with kettle bell    No weight  10#  10#    5#   1  1  1  1  1   10  10  10  10  10                         Therapeutic Activities (63144)  Start time:  End time:                                   Neuromuscular Re-ed (78901)  Start time: 302  End time: 328       airex single leg stance , semi tandem  LLE with UE PRN     BAPS board taps  Each direction , in //   Balance rocker board   Fwd & lat in //  , UE support PRN   CC walk outs fwd, backward, lat  3 plates  Each    Step ups on armani disc   UE support in //   baps board CC/CCW       Cone walk  No UE support    SL star cone taps   2 x1' UE support PRN   Shuttle SL balance  5 sec holds   Tandem walk in // bars  No UE support    Modified L SL RDL multidirection cone touch   2 10    rebounder ball toss SLS 3# 2 10                         Soccer ball rolls       sitting   Manual Intervention ([phone])  Start time:   End time:        L foot/ankle PROM, ,  mobs, Manual resistance for DF/PF/IN/EV                                                     Pt. Education:  -patient educated on diagnosis, prognosis and expectations for rehab  -all patient questions were answered    HEP instruction:    HEP instruction: Written HEP instructions provided and reviewed   Access Code: [email or web address]   URL: [email or web address]   Date: 05/26/2020   Prepared by: Rohit City     Exercises   · Long Sitting Ankle Plantar Flexion with Resistance - 10 reps - 3 sets - 1x daily - 7x weekly   · Long Sitting Ankle Eversion with Resistance - 10 reps - 3 sets - 1x daily - 7x weekly   · Long Sitting Ankle Inversion with Resistance - 10 reps - 3 sets - 1x daily - 7x weekly   · Seated Toe Raise - 10 reps - 3 sets - 1x daily - 7x weekly          Therapeutic Exercise and NMR EXR  [x] (81705) Provided verbal/tactile cueing for activities related to strengthening, flexibility, endurance, ROM for improvements in  [] LE / Lumbar: LE, proximal hip, and core control with self care, mobility, lifting, ambulation. [] UE / Cervical: cervical, postural, scapular, scapulothoracic and UE control with self care, reaching, carrying, lifting, house/yardwork, driving, computer work.  [] (91882) Provided verbal/tactile cueing for activities related to improving balance, coordination, kinesthetic sense, posture, motor skill, proprioception to assist with   [] LE / lumbar: LE, proximal hip, and core control in self care, mobility, lifting, ambulation and eccentric single leg control.    [] UE / cervical: cervical, scapular, scapulothoracic and UE control with self care, reaching, carrying, lifting, house/yardwork, driving, computer work.   [] (21821) Therapist is in constant attendance of 2 or more patients providing skilled therapy posture, motor skill, proprioception of   [] LE: core, proximal hip and LE for self care, mobility, lifting, and ambulation/stair navigation    [] UE / Cervical: cervical, postural,  scapular, scapulothoracic and UE control with self care, reaching, carrying, lifting, house/yardwork, driving, computer work    Manual Treatments:  PROM / STM / Oscillations-Mobs:  G-I, II, III, IV (PA's, Inf., Post.)  [] (56487) Provided manual therapy to mobilize LE, proximal hip and/or LS spine soft tissue/joints for the purpose of modulating pain, promoting relaxation,  increasing ROM, reducing/eliminating soft tissue swelling/inflammation/restriction, improving soft tissue extensibility and allowing for proper ROM for normal function with   [] LE / lumbar: self care, mobility, lifting and ambulation. [] UE / Cervical: self care, reaching, carrying, lifting, house/yardwork, driving, computer work. Modalities:  [] (02437) Vasopneumatic compression: Utilized vasopneumatic compression to decrease edema / swelling for the purpose of improving mobility and quad tone / recruitment which will allow for increased overall function including but not limited to self-care, transfers, ambulation, and ascending / descending stairs. Modalities:  IFC to L ankle , 15 min with CP  Start time:    End time:     Charges:  Timed Code Treatment Minutes: 53   Total Treatment Minutes: 53      [] EVAL - LOW (70548)   [] EVAL - MOD (37413)  [] EVAL - HIGH (95491)  [] RE-EVAL (63302)  [x] TM(65781) x   2    [] Ionto  [x] NMR (48051) x  2     [] Vaso  [] Manual (30065) x 1     [] Ultrasound  [] TA x        [] Mech Traction (93418)  [] Aquatic Therapy x     [] ES (un) (81155):   [] Home Management Training x  [] ES(attended) (32540)   [] Dry Needling 1-2 muscles (20627):  [] Dry Needling 3+ muscles (959709  [] Group:      [] Other:       GOALS:  Patient stated goal: to get back to work   [x]? Progressing: []? Met: []?  Not Met: []? Adjusted     Therapist goals for Patient:   Short Term Goals: To be achieved in: 2 weeks  1. Independent in HEP and progression per patient tolerance, in order to prevent re-injury. [x]? Progressing: []? Met: []? Not Met: []? Adjusted  2. Patient will have a decrease in pain to 0/10 to facilitate improvement in movement, function, and ADLs as indicated by Functional Deficits. [x]? Progressing: []? Met: []? Not Met: []? Adjusted     Long Term Goals: To be achieved in: 6 weeks  1. Disability index score of 90% or less for the LEFS to assist with reaching prior level of function. [x]? Progressing: []? Met: []? Not Met: []? Adjusted  2. Patient will demonstrate increased AROM to L foot to WNL to allow for proper joint functioning as indicated by patients Functional Deficits. [x]? Progressing: []? Met: []? Not Met: []? Adjusted  3. Patient will demonstrate an increase in Strength to at least 4+/5 to L foot as well as good proximal hip strength and control to allow for proper functional mobility as indicated by patients Functional Deficits. [x]? Progressing: []? Met: []? Not Met: []? Adjusted  4. Patient will return to functional activities including walking, standing for prolonged periods of time without increased symptoms or restriction. [x]? Progressing: []? Met: []? Not Met: []? Adjusted         Overall Progression Towards Functional goals/ Treatment Progress Update:  [] Patient is progressing as expected towards functional goals listed. [] Progression is slowed due to complexities/Impairments listed. [] Progression has been slowed due to co-morbidities.   [x] Plan just implemented, too soon to assess goals progression <30days   [] Goals require adjustment due to lack of progress  [] Patient is not progressing as expected and requires additional follow up with physician  [] Other    Persisting Functional Limitations/Impairments:  []Sleeping []Sitting               [x]Standing []Transfers        [x]Walking []Kneeling               [x]Stairs []Squatting / bending   [x]ADLs []Reaching  []Lifting  []Housework  []Driving [x]Job related tasks  []Sports/Recreation []Other:        ASSESSMENT: Pt tolerated closed chain and balance activities this date well. She still requires PRN UE support with more difficult balance activities but is progressing well. Tracy LOERA called this date to co-sign jayna on epic, message left with MA. Treatment/Activity Tolerance:   [x] Patient able to complete tx [] Patient limited by fatigue  [] Patient limited by pain  [] Patient limited by other medical complications  [] Other:     Prognosis: [x] Good [] Fair  [] Poor    Patient Requires Follow-up: [x] Yes  [] No    Plan for next treatment session: progress ankle exercises, balance closed chain      PLAN: See jayna. PT 2x / week for 6 weeks. [x] Continue per plan of care [] Alter current plan (see comments)  [] Plan of care initiated [] Hold pending MD visit [] Discharge    Electronically signed by: Khai Velazquez PT, DPT    Note: If patient does not return for scheduled/ recommended follow up visits, this note will serve as a discharge from care along with most recent update on progress.

## 2020-07-01 ENCOUNTER — APPOINTMENT (OUTPATIENT)
Dept: PHYSICAL THERAPY | Age: 20
End: 2020-07-01
Payer: COMMERCIAL

## 2020-07-02 ENCOUNTER — HOSPITAL ENCOUNTER (OUTPATIENT)
Dept: PHYSICAL THERAPY | Age: 20
Setting detail: THERAPIES SERIES
Discharge: HOME | End: 2020-07-02
Payer: COMMERCIAL

## 2020-07-02 PROCEDURE — 97112 NEUROMUSCULAR REEDUCATION: CPT

## 2020-07-02 PROCEDURE — 97110 THERAPEUTIC EXERCISES: CPT

## 2020-07-02 NOTE — FLOWSHEET NOTE
[facility] Physical Therapy  Phone: [phone]   Fax: [phone]    Physical Therapy Daily Treatment Note  Date:  2020    Patient Name:  Yaakov Donato    :  2000  MRN: [id number]  Medical/Treatment Diagnosis Information:    Diagnosis: N08.785L (ICD-10-CM) - Unspecified sprain of left foot, initial encounter , S90.32XA (ICD-10-CM) - Contusion of left foot, initial encounter      Treatment Diagnosis: L foot pain, weakness, decreased ROM        Insurance/Certification information:   [facility]  Physician Information:    Referring Practitioner: LUZ MARIA Reis      Plan of care signed (Y/N): []  Yes [x]  No     Date of Patient follow up with Physician:      Progress Report: [x]  Yes  []  No     Date Range for reporting period:  Beginning 20  Endin20    Progress report due (10 Rx/or 30 days whichever is less): weekly      Recertification due (POC duration/ or 90 days whichever is less): 20    Visit # Insurance Allowable Auth required? Date Range   12 [x]  Yes  []  No n/a     Latex Allergy:  [x]NO      []YES  Preferred Language for Healthcare:   [x]English       []other:    Functional Scale:       Date assessed:  LEFS: raw score = 69; dysfunction = 1-19%   20     Pain level:  0/10      SUBJECTIVE:  Pt reports she is doing good, ankle continues to be pain free with daily activities and working. She states no complaints.        OBJECTIVE:       PROM AROM AROM PROM AROM PROM AROM PROM     L R L R L 6/ L 6/ L  L  L  L    Hip Flexion                 Hip Abduction                 Hip ER                 Hip IR                 Knee Flexion                 Knee Extension                 Dorsiflexion  10 20 5 20 7 15 5 15 7 15   Plantarflexion  50 50 45 50 50 50 50 60 52 60   Inversion  25 30 20 30 20 25 35 40 35 45   Eversion  15 25 10 25 15 20 15 25 25 27         Strength (0-5) / Myotomes Left Right L  6/4 L 6/ L 18 multidirection cone touch      rebounder ball toss SLS 4# 3 10    rebounder ball toss tandem on airex 4# 3 10                  Soccer ball rolls       sitting   Manual Intervention ([id number])  Start time:   End time:        L foot/ankle PROM, ,  mobs, Manual resistance for DF/PF/IN/EV                                                     Pt. Education:  -patient educated on diagnosis, prognosis and expectations for rehab  -all patient questions were answered    HEP instruction:    HEP instruction: Written HEP instructions provided and reviewed   Access Code: [email or web address]   URL: [email or web address]   Date: 05/26/2020   Prepared by: Helen Rivera     Exercises   · Long Sitting Ankle Plantar Flexion with Resistance - 10 reps - 3 sets - 1x daily - 7x weekly   · Long Sitting Ankle Eversion with Resistance - 10 reps - 3 sets - 1x daily - 7x weekly   · Long Sitting Ankle Inversion with Resistance - 10 reps - 3 sets - 1x daily - 7x weekly   · Seated Toe Raise - 10 reps - 3 sets - 1x daily - 7x weekly          Therapeutic Exercise and NMR EXR  [x] (80258) Provided verbal/tactile cueing for activities related to strengthening, flexibility, endurance, ROM for improvements in  [] LE / Lumbar: LE, proximal hip, and core control with self care, mobility, lifting, ambulation. [] UE / Cervical: cervical, postural, scapular, scapulothoracic and UE control with self care, reaching, carrying, lifting, house/yardwork, driving, computer work.  [] (97841) Provided verbal/tactile cueing for activities related to improving balance, coordination, kinesthetic sense, posture, motor skill, proprioception to assist with   [] LE / lumbar: LE, proximal hip, and core control in self care, mobility, lifting, ambulation and eccentric single leg control.    [] UE / cervical: cervical, scapular, scapulothoracic and UE control with self care, reaching, carrying, lifting, house/yardwork, driving, computer work.   [] (16830) Therapist is in constant improving balance, coordination, kinesthetic sense, posture, motor skill, proprioception of   [] LE: core, proximal hip and LE for self care, mobility, lifting, and ambulation/stair navigation    [] UE / Cervical: cervical, postural,  scapular, scapulothoracic and UE control with self care, reaching, carrying, lifting, house/yardwork, driving, computer work    Manual Treatments:  PROM / STM / Oscillations-Mobs:  G-I, II, III, IV (PA's, Inf., Post.)  [] (45572) Provided manual therapy to mobilize LE, proximal hip and/or LS spine soft tissue/joints for the purpose of modulating pain, promoting relaxation,  increasing ROM, reducing/eliminating soft tissue swelling/inflammation/restriction, improving soft tissue extensibility and allowing for proper ROM for normal function with   [] LE / lumbar: self care, mobility, lifting and ambulation. [] UE / Cervical: self care, reaching, carrying, lifting, house/yardwork, driving, computer work. Modalities:  [] (07859) Vasopneumatic compression: Utilized vasopneumatic compression to decrease edema / swelling for the purpose of improving mobility and quad tone / recruitment which will allow for increased overall function including but not limited to self-care, transfers, ambulation, and ascending / descending stairs. Modalities:  IFC to L ankle , 15 min with CP  Start time:    End time:     Charges:  Timed Code Treatment Minutes: 39   Total Treatment Minutes: 39      [] EVAL - LOW (77201)   [] EVAL - MOD (35004)  [] EVAL - HIGH (92463)  [] RE-EVAL (72427)  [x] AK(27881) x   2    [] Ionto  [x] NMR (44647) x 1     [] Vaso  [] Manual (10964) x 1     [] Ultrasound  [] TA x        [] Mech Traction (35357)  [] Aquatic Therapy x     [] ES (un) (43499):   [] Home Management Training x  [] ES(attended) (57530)   [] Dry Needling 1-2 muscles (11284):  [] Dry Needling 3+ muscles (736332  [] Group:      [] Other:       GOALS:  Patient stated goal: to get back to work   [x]? Progressing: []? Met: []? Not Met: []? Adjusted     Therapist goals for Patient:   Short Term Goals: To be achieved in: 2 weeks  1. Independent in HEP and progression per patient tolerance, in order to prevent re-injury. [x]? Progressing: []? Met: []? Not Met: []? Adjusted  2. Patient will have a decrease in pain to 0/10 to facilitate improvement in movement, function, and ADLs as indicated by Functional Deficits. [x]? Progressing: []? Met: []? Not Met: []? Adjusted     Long Term Goals: To be achieved in: 6 weeks  1. Disability index score of 90% or less for the LEFS to assist with reaching prior level of function. [x]? Progressing: []? Met: []? Not Met: []? Adjusted  2. Patient will demonstrate increased AROM to L foot to WNL to allow for proper joint functioning as indicated by patients Functional Deficits. [x]? Progressing: []? Met: []? Not Met: []? Adjusted  3. Patient will demonstrate an increase in Strength to at least 4+/5 to L foot as well as good proximal hip strength and control to allow for proper functional mobility as indicated by patients Functional Deficits. [x]? Progressing: []? Met: []? Not Met: []? Adjusted  4. Patient will return to functional activities including walking, standing for prolonged periods of time without increased symptoms or restriction. [x]? Progressing: []? Met: []? Not Met: []? Adjusted         Overall Progression Towards Functional goals/ Treatment Progress Update:  [] Patient is progressing as expected towards functional goals listed. [] Progression is slowed due to complexities/Impairments listed. [] Progression has been slowed due to co-morbidities.   [x] Plan just implemented, too soon to assess goals progression <30days   [] Goals require adjustment due to lack of progress  [] Patient is not progressing as expected and requires additional follow up with physician  [] Other    Persisting Functional Limitations/Impairments:  []Sleeping []Sitting               [x]Standing []Transfers        [x]Walking []Kneeling               [x]Stairs []Squatting / bending   [x]ADLs []Reaching  []Lifting  []Housework  []Driving [x]Job related tasks  []Sports/Recreation []Other:        ASSESSMENT: Pt does well with progressions. Reports increased tenderness on outer lower leg due to muscle fatigue. Pt able to stretch ankle at end of session and feels good walking out. Treatment/Activity Tolerance:   [x] Patient able to complete tx [] Patient limited by fatigue  [] Patient limited by pain  [] Patient limited by other medical complications  [] Other:     Prognosis: [x] Good [] Fair  [] Poor    Patient Requires Follow-up: [x] Yes  [] No    Plan for next treatment session: progress ankle exercises, balance closed chain      PLAN: See jayna. PT 2x / week for 6 weeks. [x] Continue per plan of care [] Alter current plan (see comments)  [] Plan of care initiated [] Hold pending MD visit [] Discharge    Electronically signed by: Alisha Godoy PT, DPT    Note: If patient does not return for scheduled/ recommended follow up visits, this note will serve as a discharge from care along with most recent update on progress.

## 2020-07-07 ENCOUNTER — HOSPITAL ENCOUNTER (OUTPATIENT)
Dept: PHYSICAL THERAPY | Age: 20
Setting detail: THERAPIES SERIES
Discharge: HOME | End: 2020-07-07
Payer: COMMERCIAL

## 2020-07-07 PROCEDURE — 97110 THERAPEUTIC EXERCISES: CPT

## 2020-07-07 PROCEDURE — 97112 NEUROMUSCULAR REEDUCATION: CPT

## 2020-07-07 NOTE — FLOWSHEET NOTE
[facility] Physical Therapy  Phone: [phone]   Fax: [phone]       Physical Therapy Discharge Summary    Dear  ,LUZ MARIA Carlson        We had the pleasure of treating the following patient for physical therapy services at [address] [place] [address]. A summary of our findings can be found in the discharge summary below. If you have any questions or concerns regarding these findings, please do not hesitate to contact me at the office phone number checked above. Thank you for the referral.     Physician Signature:________________________________Date:__________________  By signing above (or electronic signature), therapists plan is approved by physician        Overall Response to Treatment:   [x]Patient is responding well to treatment and improvement is noted with regards  to goals   []Patient should continue to improve in reasonable time if they continue HEP   []Patient has plateaued and is no longer responding to skilled PT intervention    []Patient is getting worse and would benefit from return to referring MD   []Patient unable to adhere to initial POC   []Other:     Date range of Visits: 20 - 20  Total Visits: 12    Recommendation:    [x] Discharge to [facility]. Follow up with PT PRN.        Date:  2020    Patient Name:  Cathryn Gupta    :  2000  MRN: [id number]  Medical/Treatment Diagnosis Information:    Diagnosis: Q10.616G (ICD-10-CM) - Unspecified sprain of left foot, initial encounter , S90.32XA (ICD-10-CM) - Contusion of left foot, initial encounter      Treatment Diagnosis: L foot pain, weakness, decreased ROM        Insurance/Certification information:   [facility]  Physician Information:    Referring Practitioner: LUZ MARIA Carlson      Plan of care signed (Y/N): []  Yes [x]  No     Date of Patient follow up with Physician:      Progress Report: []  Yes  [x]  No       Progress report due (10 Rx/or 30 days whichever is less): weekly      Recertification due (POC duration/ or 90 days whichever is less): 7/7/20    Visit # Insurance Allowable Auth required? Date Range   12/12 12 [x]  Yes  []  No n/a     Latex Allergy:  [x]NO      []YES  Preferred Language for Healthcare:   [x]English       []other:    Functional Scale:       Date assessed:  LEFS: raw score = 75; dysfunction = 1-19%   7/7/20     Pain level:  0/10      SUBJECTIVE:  Pt reports she feels like she is very close to 100%, no pain, wearing steel toe boots to work with no issues.       OBJECTIVE:       PROM AROM AROM PROM AROM PROM AROM PROM AROM PROM     L R L R L 6/4 L 6/4 L 6/11 L 6/11 L 6/18 L 6/18 L 7/7 L 7/7   Hip Flexion                   Hip Abduction                   Hip ER                   Hip IR                   Knee Flexion                   Knee Extension                   Dorsiflexion  10 20 5 20 7 15 5 15 7 15 8 15   Plantarflexion  50 50 45 50 50 50 50 60 52 60 55 60   Inversion  25 30 20 30 20 25 35 40 35 45 35 45   Eversion  15 25 10 25 15 20 15 25 25 27 25 28         Strength (0-5) / Myotomes Left Right L  6/4 L 6/11 L 6/18 L 7/7    Hip Flexion - supine            Hip Flexion - seated (L1-2)            Hip Abduction            Hip Adduction            Hip ER            Hip IR            Quads (L2-4)            Hamstrings            Ankle Dorsiflexion (L4-5) 3+ 5 4 4 4+ 5    Ankle Plantarflexion (S1-2) 3+ 5 4 4+ 4+ 5    Ankle Inversion 3+ 5 4- 4- 4  5    Ankle Eversion (S1-2) 3+ 5 4- 4- 4 5    Great Toe Extension (L5)                                                                                                                                                                                   RESTRICTIONS/PRECAUTIONS: n/a    Exercises/Interventions:     Therapeutic Exercises (52888)  Start time: 300  End time: 309 Resistance / level Sets/sec Reps Notes   4 way ankle  ^to blue   nustep  2.0 6'   ^ level 2.0 6/23    AROM L ankle  ^ 1lb ankle weight around foot   INV isometrics with ball  3 sec holds   Towel cruntches     Zeviavincent windshield wipers      IB/  HR  3 30'' 10  HR each set, without UE support     Foot marble         Toe walk   6/30 ^LAPS   Heel walk   6/30 ^LAPS   TG squats,R abd, HR  Cues to maintain arch    R Lateral step down- 6 in   L SLS   [employer]:  FM step up  FM standing weighted heel raise  Seated heel raise  TRX rev lunge  SL RDL with kettle trotter     DL Leg Press    DL Calf Press                                Therapeutic Activities (84935)  Start time:  End time:                                   Neuromuscular Re-ed (67331)  Start time: 309  End time: 340       airex single leg stance , semi tandem  LLE with UE PRN     BAPS board taps  Each direction , in //   Balance rocker board   Fwd & lat in //  , UE support PRN   CC walk outs fwd, backward, lat  3 plates  1 5 Each    Step ups on armani disc   UE support in //   baps board CC/CCW       Cone walk  No UE support    SL star cone taps   UE support PRN   Shuttle SL balance  1 10 5 sec holds   Tandem walk in // bars  No UE support    Modified L SL RDL multidirection cone touch      rebounder ball toss SLS fwd/ R  4# 3 10    rebounder ball toss tandem on airex 4#    bosu step up   1 10    Black bosu balance  2 30''    Soccer ball rolls       sitting   Manual Intervention ([id number])  Start time:   End time:        L foot/ankle PROM, ,  mobs, Manual resistance for DF/PF/IN/EV                                                     Pt. Education:  -patient educated on diagnosis, prognosis and expectations for rehab  -all patient questions were answered    HEP instruction:    HEP instruction: Written HEP instructions provided and reviewed   Access Code: [email or web address]   URL: [email or web address]   Date: 05/26/2020   Prepared by: Brian Ramirez     Exercises   · Long Sitting Ankle Plantar Flexion with Resistance - 10 reps - 3 sets - 1x daily - 7x weekly   · Long Sitting Ankle Eversion with Resistance - 10 reps - 3 sets - 1x daily - 7x weekly   · Long Sitting Ankle Inversion with Resistance - 10 reps - 3 sets - 1x daily - 7x weekly   · Seated Toe Raise - 10 reps - 3 sets - 1x daily - 7x weekly          Therapeutic Exercise and NMR EXR  [x] (11073) Provided verbal/tactile cueing for activities related to strengthening, flexibility, endurance, ROM for improvements in  [] LE / Lumbar: LE, proximal hip, and core control with self care, mobility, lifting, ambulation. [] UE / Cervical: cervical, postural, scapular, scapulothoracic and UE control with self care, reaching, carrying, lifting, house/yardwork, driving, computer work.  [] (53790) Provided verbal/tactile cueing for activities related to improving balance, coordination, kinesthetic sense, posture, motor skill, proprioception to assist with   [] LE / lumbar: LE, proximal hip, and core control in self care, mobility, lifting, ambulation and eccentric single leg control. [] UE / cervical: cervical, scapular, scapulothoracic and UE control with self care, reaching, carrying, lifting, house/yardwork, driving, computer work.   [] (67619) Therapist is in constant attendance of 2 or more patients providing skilled therapy interventions, but not providing any significant amount of measurable one-on-one time to either patient, for improvements in  [] LE / lumbar: LE, proximal hip, and core control in self care, mobility, lifting, ambulation and eccentric single leg control. [] UE / cervical: cervical, scapular, scapulothoracic and UE control with self care, reaching, carrying, lifting, house/yardwork, driving, computer work.      NMR and Therapeutic Activities:    [x] (80617 or 56159) Provided verbal/tactile cueing for activities related to improving balance, coordination, kinesthetic sense, posture, motor skill, proprioception and motor activation to allow for proper function of   [] LE: / Lumbar core, proximal hip and LE with self care and ADLs  [] UE / Cervical: mobility, lifting and ambulation. [] UE / Cervical: self care, reaching, carrying, lifting, house/yardwork, driving, computer work. Modalities:  [] (37765) Vasopneumatic compression: Utilized vasopneumatic compression to decrease edema / swelling for the purpose of improving mobility and quad tone / recruitment which will allow for increased overall function including but not limited to self-care, transfers, ambulation, and ascending / descending stairs. Modalities:    Start time:    End time:     Charges:  Timed Code Treatment Minutes: 40   Total Treatment Minutes: 40      [] EVAL - LOW (00296)   [] EVAL - MOD (12067)  [] EVAL - HIGH (01152)  [] RE-EVAL (00803)  [x] OW(75384) x   1    [] Ionto  [x] NMR (11290) x 2     [] Vaso  [] Manual (23770) x 1     [] Ultrasound  [] TA x        [] Mech Traction (87509)  [] Aquatic Therapy x     [] ES (un) (96495):   [] Home Management Training x  [] ES(attended) (24956)   [] Dry Needling 1-2 muscles (70344):  [] Dry Needling 3+ muscles (227537  [] Group:      [] Other:       GOALS:  Patient stated goal: to get back to work   [x]? Progressing: []? Met: []? Not Met: []? Adjusted     Therapist goals for Patient:   Short Term Goals: To be achieved in: 2 weeks  1. Independent in HEP and progression per patient tolerance, in order to prevent re-injury. []? Progressing: [x]? Met: []? Not Met: []? Adjusted  2. Patient will have a decrease in pain to 0/10 to facilitate improvement in movement, function, and ADLs as indicated by Functional Deficits. []? Progressing: [x]? Met: []? Not Met: []? Adjusted     Long Term Goals: To be achieved in: 6 weeks  1. Disability index score of 90% or less for the LEFS to assist with reaching prior level of function. []? Progressing: [x]? Met: []? Not Met: []? Adjusted  2. Patient will demonstrate increased AROM to L foot to WNL to allow for proper joint functioning as indicated by patients Functional Deficits. []? Progressing: [x]? Met: []? Not Met: []? Adjusted  3. Patient will demonstrate an increase in Strength to at least 4+/5 to L foot as well as good proximal hip strength and control to allow for proper functional mobility as indicated by patients Functional Deficits. []? Progressing: [x]? Met: []? Not Met: []? Adjusted  4. Patient will return to functional activities including walking, standing for prolonged periods of time without increased symptoms or restriction. []? Progressing: [x]? Met: []? Not Met: []? Adjusted         Overall Progression Towards Functional goals/ Treatment Progress Update:  [x] Patient is progressing as expected towards functional goals listed. - goals met   [] Progression is slowed due to complexities/Impairments listed. [] Progression has been slowed due to co-morbidities. [] Plan just implemented, too soon to assess goals progression <30days   [] Goals require adjustment due to lack of progress  [] Patient is not progressing as expected and requires additional follow up with physician  [] Other    Persisting Functional Limitations/Impairments:  []Sleeping []Sitting               []Standing []Transfers        []Walking []Kneeling               []Stairs []Squatting / bending   []ADLs []Reaching  []Lifting  []Housework  []Driving []Job related tasks  []Sports/Recreation []Other:        ASSESSMENT: Pt has now met all goals, used all 12 workers comp visits and has returned to work with no issues or pain. Strength and ROM have return to normal. No further PT is recommended. Treatment/Activity Tolerance:   [x] Patient able to complete tx [] Patient limited by fatigue  [] Patient limited by pain  [] Patient limited by other medical complications  [] Other:     Prognosis: [x] Good [] Fair  [] Poor    Patient Requires Follow-up: [] Yes  [x] No      PLAN: See eval. PT 2x / week for 6 weeks.    [] Continue per plan of care [] Alter current plan (see comments)  [] Plan of care initiated [] Hold pending MD visit [x] Discharge    Electronically signed by: María Johansen PT, DPT    Note: If patient does not return for scheduled/ recommended follow up visits, this note will serve as a discharge from care along with most recent update on progress.

## 2020-07-10 ENCOUNTER — APPOINTMENT (OUTPATIENT)
Dept: PHYSICAL THERAPY | Age: 20
End: 2020-07-10
Payer: COMMERCIAL

## 2020-10-26 ENCOUNTER — OFFICE VISIT (OUTPATIENT)
Dept: ORTHOPEDIC SURGERY | Age: 20
End: 2020-10-26
Payer: COMMERCIAL

## 2020-10-26 VITALS — HEIGHT: 66 IN | WEIGHT: 200 LBS | BODY MASS INDEX: 32.14 KG/M2 | TEMPERATURE: 98.6 F

## 2020-10-26 PROCEDURE — 99203 OFFICE O/P NEW LOW 30 MIN: CPT | Performed by: ORTHOPAEDIC SURGERY

## 2020-10-26 NOTE — PROGRESS NOTES
[facility]  Office Visit  Date:      Name:  Fina Pulliam  Address:  [address] [place] [address] [facility] [phone]    :  2000      Age:   23 y.o.    SSN:  [id number]      Medical Record Number:  [id number]    Chief Complaint:    L foot pain     HPI:   Preston Cunningham is a 23 y.o. female who presents today for evaluation of the L foot. Been having pain since her injury at work, she works at [employer] on 2020 she got her foot stuck between a forklift and a box. Since that time she has been having medial sided foot pain. She was evaluated with radiographs as well as an MRI. She did physical therapy for about a month from May to July. She has pain with extended periods of standing along the arch and she experiences pain in the morning. She is utilized ibuprofen for pain relief. She has no history of steroid injections or night splint. She has been back at work since the injury, it is painful with her steel toed boots to stand for extended periods of time. She denies any new numbness, tingling, fevers, chills, chest pain, shortness of breath, or any other new significant symptoms. Pain Assessment  Location of Pain: Foot  Location Modifiers: Left  Severity of Pain: 6  Quality of Pain: Aching  Duration of Pain: Persistent  Frequency of Pain: Intermittent  Limiting Behavior: Yes  Relieving Factors: Rest, Ice  Result of Injury: Yes  Work-Related Injury: Yes  Are there other pain locations you wish to document?: No    Past History:  No past medical history on file. No past surgical history on file. Social History     Tobacco Use    Smoking status: Never Smoker    Smokeless tobacco: Never Used   Substance Use Topics    Alcohol use: Yes     Comment: sometimes    Drug use: Not on file        Family History:  family history is not on file. No current outpatient medications on file.       Allergies   Allergen were made to ensure that this office note is accurate. Attestation:  I was physically present and performed my own examination of this patient and have discussed the case, including pertinent history and exam findings with the fellow. I agree with the documented assessment and plan. Onesimo Valdez.  Mauro Guillaume MD
